# Patient Record
Sex: FEMALE | Race: WHITE | HISPANIC OR LATINO | Employment: FULL TIME | URBAN - METROPOLITAN AREA
[De-identification: names, ages, dates, MRNs, and addresses within clinical notes are randomized per-mention and may not be internally consistent; named-entity substitution may affect disease eponyms.]

---

## 2024-06-04 ENCOUNTER — OFFICE VISIT (OUTPATIENT)
Dept: URGENT CARE | Facility: CLINIC | Age: 39
End: 2024-06-04
Payer: COMMERCIAL

## 2024-06-04 VITALS
DIASTOLIC BLOOD PRESSURE: 80 MMHG | TEMPERATURE: 97.6 F | WEIGHT: 259 LBS | HEIGHT: 66 IN | OXYGEN SATURATION: 100 % | BODY MASS INDEX: 41.62 KG/M2 | HEART RATE: 68 BPM | SYSTOLIC BLOOD PRESSURE: 140 MMHG | RESPIRATION RATE: 19 BRPM

## 2024-06-04 DIAGNOSIS — R51.9 GENERALIZED HEADACHE: ICD-10-CM

## 2024-06-04 DIAGNOSIS — R03.0 ELEVATED BLOOD PRESSURE READING: ICD-10-CM

## 2024-06-04 PROCEDURE — 99203 OFFICE O/P NEW LOW 30 MIN: CPT | Performed by: NURSE PRACTITIONER

## 2024-06-04 PROCEDURE — 3075F SYST BP GE 130 - 139MM HG: CPT | Performed by: NURSE PRACTITIONER

## 2024-06-04 PROCEDURE — 3079F DIAST BP 80-89 MM HG: CPT | Performed by: NURSE PRACTITIONER

## 2024-06-04 ASSESSMENT — VISUAL ACUITY: OU: 1

## 2024-06-04 NOTE — PROGRESS NOTES
"Cecelia Baldwin is a 38 y.o. female who presents for Hypertension Follow-up      HPI  This is a new problem. Cecelia Baldwin is a 38 y.o. patient who presents to urgent care with c/o: Feeling nauseated at work today with headache. She had her BP checked at work and it was high 159/98. She does not take medications for elevated BP. She does not see a PCP. Denies swelling in legs. Does not have HA's daily but had headaches occasionally that come and go and usually do not bother her.  Had blood pressure medicines little over a year ago.  She stopped taking them because she stopped seeing her provider and ran out of pills.  She has a positive family history of hypertension in her both her mother and her father.  Denies chest pain, sob, vision change ( she says her vision is blurry all the time for a long time - no change).   She just started her job at [employer] 15 days ago.  It has been stressful learning a new job and going through orientation.  She walks a lot at her new job which she has found very fatiguing.    ROS See HPI    Allergies:       Allergies   Allergen Reactions    Penicillins Swelling     Swelling        PMSFS Hx:  History reviewed. No pertinent past medical history.  History reviewed. No pertinent surgical history.  History reviewed. No pertinent family history.  Social History     Tobacco Use    Smoking status: Not on file    Smokeless tobacco: Not on file   Substance Use Topics    Alcohol use: Not on file       Problems:   There is no problem list on file for this patient.      Medications:   No current outpatient medications on file prior to visit.     No current facility-administered medications on file prior to visit.        Objective:     BP (!) 138/98   Pulse 68   Temp 36.4 °C (97.6 °F) (Temporal)   Resp 19   Ht 1.676 m (5' 6\")   Wt 117 kg (259 lb)   SpO2 100%   BMI 41.80 kg/m²     Physical Exam  Vitals and nursing note reviewed.   Constitutional:       General: She is not in acute distress.     " Appearance: Normal appearance.   HENT:      Mouth/Throat:      Mouth: Mucous membranes are moist.   Eyes:      General: Vision grossly intact.      Extraocular Movements: Extraocular movements intact.      Conjunctiva/sclera: Conjunctivae normal.   Cardiovascular:      Rate and Rhythm: Normal rate and regular rhythm.      Pulses: Normal pulses.      Heart sounds: Normal heart sounds.   Pulmonary:      Effort: Pulmonary effort is normal.      Breath sounds: Normal breath sounds.   Musculoskeletal:      Right lower leg: No edema.      Left lower leg: No edema.   Skin:     General: Skin is warm.      Capillary Refill: Capillary refill takes less than 2 seconds.   Neurological:      Mental Status: She is alert and oriented to person, place, and time.   Psychiatric:         Mood and Affect: Mood normal.         Behavior: Behavior normal.         Thought Content: Thought content normal.         Assessment /Associated Orders:      1. Elevated blood pressure reading  Referral to establish with PCP      2. Generalized headache  Referral to establish with PCP            Medical Decision Making:    Cecelia  is a very pleasant 38 y.o. female who is clinically stable at today's acute urgent care visit.  No acute distress noted.  VSS. Appropriate for outpatient care at this time.   Acute problem today with uncertain prognosis.   Referred to  primary care provider for monitoring and management. Educated in end organ effects of uncontrolled BP  including MI, CVA, Blindness, CRF and death. Educated in TLC's.  Advised close monitoring.  Home blood pressure monitor.  Keep a written record and take to PCP appointment.  Seek emergent care if having acute symptoms such as chest pain, weakness, persistent headache, vision change  Keep well-hydrated  Increase rest  Discussed Dx, management options (risks,benefits, and alternatives to planned treatment), natural progression and supportive care.  Expressed understanding and the treatment plan  was agreed upon.   Questions were encouraged and answered   Return to urgent care prn if new or worsening sx or if there is no improvement in condition prn.    Educated in Red flags and indications to immediately call 911 or present to the Emergency Department.       Time I spent evaluating Cecelia Baldwin in urgent care today was 30  minutes. This time includes preparing for visit, reviewing any pertinent notes or test results, counseling/education, exam, obtaining HPI, interpretation of lab tests, medication management and documentation as indicated above.Time does not include separately billable procedures noted .       Please note that this dictation was created using voice recognition software. I have worked with consultants from the vendor as well as technical experts from [facility] to optimize the interface. I have made every reasonable attempt to correct obvious errors, but I expect that there are errors of grammar and possibly content that I did not discover before finalizing the note.  This note was electronically signed by provider

## 2024-06-04 NOTE — LETTER
June 4, 2024       Patient: Cecelia Baldwin   YOB: 1985   Date of Visit: 6/4/2024         To Whom It May Concern:    In my medical opinion, I recommend that Cecelia may return to full duty, no restrictions.              Sincerely,          TONIE Salcedo  Electronically Signed

## 2024-07-23 SDOH — ECONOMIC STABILITY: FOOD INSECURITY: WITHIN THE PAST 12 MONTHS, THE FOOD YOU BOUGHT JUST DIDN'T LAST AND YOU DIDN'T HAVE MONEY TO GET MORE.: NEVER TRUE

## 2024-07-23 SDOH — ECONOMIC STABILITY: INCOME INSECURITY: IN THE LAST 12 MONTHS, WAS THERE A TIME WHEN YOU WERE NOT ABLE TO PAY THE MORTGAGE OR RENT ON TIME?: YES

## 2024-07-23 SDOH — ECONOMIC STABILITY: FOOD INSECURITY: WITHIN THE PAST 12 MONTHS, YOU WORRIED THAT YOUR FOOD WOULD RUN OUT BEFORE YOU GOT MONEY TO BUY MORE.: SOMETIMES TRUE

## 2024-07-23 SDOH — HEALTH STABILITY: MENTAL HEALTH
STRESS IS WHEN SOMEONE FEELS TENSE, NERVOUS, ANXIOUS, OR CAN'T SLEEP AT NIGHT BECAUSE THEIR MIND IS TROUBLED. HOW STRESSED ARE YOU?: ONLY A LITTLE

## 2024-07-23 SDOH — ECONOMIC STABILITY: HOUSING INSECURITY
IN THE LAST 12 MONTHS, WAS THERE A TIME WHEN YOU DID NOT HAVE A STEADY PLACE TO SLEEP OR SLEPT IN A SHELTER (INCLUDING NOW)?: NO

## 2024-07-23 SDOH — ECONOMIC STABILITY: INCOME INSECURITY: HOW HARD IS IT FOR YOU TO PAY FOR THE VERY BASICS LIKE FOOD, HOUSING, MEDICAL CARE, AND HEATING?: SOMEWHAT HARD

## 2024-07-23 SDOH — ECONOMIC STABILITY: HOUSING INSECURITY
IN THE LAST 12 MONTHS, WAS THERE A TIME WHEN YOU DID NOT HAVE A STEADY PLACE TO SLEEP OR SLEPT IN A SHELTER (INCLUDING NOW)?: YES

## 2024-07-23 SDOH — ECONOMIC STABILITY: TRANSPORTATION INSECURITY
IN THE PAST 12 MONTHS, HAS THE LACK OF TRANSPORTATION KEPT YOU FROM MEDICAL APPOINTMENTS OR FROM GETTING MEDICATIONS?: NO

## 2024-07-23 SDOH — HEALTH STABILITY: PHYSICAL HEALTH: ON AVERAGE, HOW MANY MINUTES DO YOU ENGAGE IN EXERCISE AT THIS LEVEL?: 150+ MIN

## 2024-07-23 SDOH — HEALTH STABILITY: PHYSICAL HEALTH: ON AVERAGE, HOW MANY DAYS PER WEEK DO YOU ENGAGE IN MODERATE TO STRENUOUS EXERCISE (LIKE A BRISK WALK)?: 3 DAYS

## 2024-07-23 SDOH — ECONOMIC STABILITY: HOUSING INSECURITY: IN THE LAST 12 MONTHS, HOW MANY PLACES HAVE YOU LIVED?: 1

## 2024-07-23 SDOH — ECONOMIC STABILITY: TRANSPORTATION INSECURITY
IN THE PAST 12 MONTHS, HAS LACK OF RELIABLE TRANSPORTATION KEPT YOU FROM MEDICAL APPOINTMENTS, MEETINGS, WORK OR FROM GETTING THINGS NEEDED FOR DAILY LIVING?: NO

## 2024-07-23 SDOH — ECONOMIC STABILITY: TRANSPORTATION INSECURITY
IN THE PAST 12 MONTHS, HAS LACK OF TRANSPORTATION KEPT YOU FROM MEETINGS, WORK, OR FROM GETTING THINGS NEEDED FOR DAILY LIVING?: NO

## 2024-07-23 ASSESSMENT — SOCIAL DETERMINANTS OF HEALTH (SDOH)
ARE YOU MARRIED, WIDOWED, DIVORCED, SEPARATED, NEVER MARRIED, OR LIVING WITH A PARTNER?: LIVING WITH PARTNER
IN A TYPICAL WEEK, HOW MANY TIMES DO YOU TALK ON THE PHONE WITH FAMILY, FRIENDS, OR NEIGHBORS?: MORE THAN THREE TIMES A WEEK
IN A TYPICAL WEEK, HOW MANY TIMES DO YOU TALK ON THE PHONE WITH FAMILY, FRIENDS, OR NEIGHBORS?: MORE THAN THREE TIMES A WEEK
HOW OFTEN DO YOU GET TOGETHER WITH FRIENDS OR RELATIVES?: NEVER
DO YOU BELONG TO ANY CLUBS OR ORGANIZATIONS SUCH AS CHURCH GROUPS UNIONS, FRATERNAL OR ATHLETIC GROUPS, OR SCHOOL GROUPS?: NO
WITHIN THE PAST 12 MONTHS, YOU WORRIED THAT YOUR FOOD WOULD RUN OUT BEFORE YOU GOT THE MONEY TO BUY MORE: SOMETIMES TRUE
HOW OFTEN DO YOU HAVE SIX OR MORE DRINKS ON ONE OCCASION: NEVER
ARE YOU MARRIED, WIDOWED, DIVORCED, SEPARATED, NEVER MARRIED, OR LIVING WITH A PARTNER?: LIVING WITH PARTNER
DO YOU BELONG TO ANY CLUBS OR ORGANIZATIONS SUCH AS CHURCH GROUPS UNIONS, FRATERNAL OR ATHLETIC GROUPS, OR SCHOOL GROUPS?: NO
HOW OFTEN DO YOU ATTENT MEETINGS OF THE CLUB OR ORGANIZATION YOU BELONG TO?: NEVER
HOW OFTEN DO YOU HAVE A DRINK CONTAINING ALCOHOL: MONTHLY OR LESS
HOW OFTEN DO YOU ATTEND CHURCH OR RELIGIOUS SERVICES?: NEVER
HOW OFTEN DO YOU ATTENT MEETINGS OF THE CLUB OR ORGANIZATION YOU BELONG TO?: NEVER
HOW MANY DRINKS CONTAINING ALCOHOL DO YOU HAVE ON A TYPICAL DAY WHEN YOU ARE DRINKING: 1 OR 2
HOW HARD IS IT FOR YOU TO PAY FOR THE VERY BASICS LIKE FOOD, HOUSING, MEDICAL CARE, AND HEATING?: SOMEWHAT HARD
HOW OFTEN DO YOU GET TOGETHER WITH FRIENDS OR RELATIVES?: NEVER
HOW OFTEN DO YOU ATTEND CHURCH OR RELIGIOUS SERVICES?: NEVER
IN THE PAST 12 MONTHS, HAS THE ELECTRIC, GAS, OIL, OR WATER COMPANY THREATENED TO SHUT OFF SERVICE IN YOUR HOME?: YES

## 2024-07-23 ASSESSMENT — LIFESTYLE VARIABLES
HOW OFTEN DO YOU HAVE A DRINK CONTAINING ALCOHOL: MONTHLY OR LESS
HOW OFTEN DO YOU HAVE SIX OR MORE DRINKS ON ONE OCCASION: NEVER
SKIP TO QUESTIONS 9-10: 1
AUDIT-C TOTAL SCORE: 1
HOW MANY STANDARD DRINKS CONTAINING ALCOHOL DO YOU HAVE ON A TYPICAL DAY: 1 OR 2

## 2024-07-26 ENCOUNTER — HOSPITAL ENCOUNTER (OUTPATIENT)
Dept: LAB | Facility: MEDICAL CENTER | Age: 39
End: 2024-07-26
Attending: FAMILY MEDICINE
Payer: COMMERCIAL

## 2024-07-26 ENCOUNTER — OFFICE VISIT (OUTPATIENT)
Dept: MEDICAL GROUP | Facility: MEDICAL CENTER | Age: 39
End: 2024-07-26
Attending: NURSE PRACTITIONER
Payer: COMMERCIAL

## 2024-07-26 VITALS
BODY MASS INDEX: 41.24 KG/M2 | DIASTOLIC BLOOD PRESSURE: 94 MMHG | HEIGHT: 66 IN | SYSTOLIC BLOOD PRESSURE: 154 MMHG | OXYGEN SATURATION: 99 % | HEART RATE: 79 BPM | WEIGHT: 256.6 LBS | TEMPERATURE: 98.1 F

## 2024-07-26 DIAGNOSIS — R51.9 CHRONIC NONINTRACTABLE HEADACHE, UNSPECIFIED HEADACHE TYPE: ICD-10-CM

## 2024-07-26 DIAGNOSIS — S89.91XA INJURY OF RIGHT KNEE, INITIAL ENCOUNTER: ICD-10-CM

## 2024-07-26 DIAGNOSIS — Z83.3 FAMILY HISTORY OF DIABETES MELLITUS (DM): ICD-10-CM

## 2024-07-26 DIAGNOSIS — I10 PRIMARY HYPERTENSION: ICD-10-CM

## 2024-07-26 DIAGNOSIS — Z00.00 ENCOUNTER FOR MEDICAL EXAMINATION TO ESTABLISH CARE: Primary | ICD-10-CM

## 2024-07-26 DIAGNOSIS — F51.01 PRIMARY INSOMNIA: ICD-10-CM

## 2024-07-26 DIAGNOSIS — Z13.220 ENCOUNTER FOR LIPID SCREENING FOR CARDIOVASCULAR DISEASE: ICD-10-CM

## 2024-07-26 DIAGNOSIS — Z13.6 ENCOUNTER FOR LIPID SCREENING FOR CARDIOVASCULAR DISEASE: ICD-10-CM

## 2024-07-26 DIAGNOSIS — R06.83 SNORING: ICD-10-CM

## 2024-07-26 DIAGNOSIS — G89.29 CHRONIC NONINTRACTABLE HEADACHE, UNSPECIFIED HEADACHE TYPE: ICD-10-CM

## 2024-07-26 DIAGNOSIS — Z13.29 THYROID DISORDER SCREENING: ICD-10-CM

## 2024-07-26 LAB
ALBUMIN SERPL BCP-MCNC: 4.4 G/DL (ref 3.2–4.9)
ALBUMIN/GLOB SERPL: 1.3 G/DL
ALP SERPL-CCNC: 81 U/L (ref 30–99)
ALT SERPL-CCNC: 26 U/L (ref 2–50)
ANION GAP SERPL CALC-SCNC: 12 MMOL/L (ref 7–16)
AST SERPL-CCNC: 19 U/L (ref 12–45)
BASOPHILS # BLD AUTO: 0.5 % (ref 0–1.8)
BASOPHILS # BLD: 0.03 K/UL (ref 0–0.12)
BILIRUB SERPL-MCNC: 0.6 MG/DL (ref 0.1–1.5)
BUN SERPL-MCNC: 8 MG/DL (ref 8–22)
CALCIUM ALBUM COR SERPL-MCNC: 9 MG/DL (ref 8.5–10.5)
CALCIUM SERPL-MCNC: 9.3 MG/DL (ref 8.4–10.2)
CHLORIDE SERPL-SCNC: 101 MMOL/L (ref 96–112)
CHOLEST SERPL-MCNC: 207 MG/DL (ref 100–199)
CO2 SERPL-SCNC: 24 MMOL/L (ref 20–33)
CREAT SERPL-MCNC: 0.64 MG/DL (ref 0.5–1.4)
EOSINOPHIL # BLD AUTO: 0.17 K/UL (ref 0–0.51)
EOSINOPHIL NFR BLD: 3 % (ref 0–6.9)
ERYTHROCYTE [DISTWIDTH] IN BLOOD BY AUTOMATED COUNT: 43.6 FL (ref 35.9–50)
EST. AVERAGE GLUCOSE BLD GHB EST-MCNC: 117 MG/DL
FASTING STATUS PATIENT QL REPORTED: NORMAL
GFR SERPLBLD CREATININE-BSD FMLA CKD-EPI: 115 ML/MIN/1.73 M 2
GLOBULIN SER CALC-MCNC: 3.3 G/DL (ref 1.9–3.5)
GLUCOSE SERPL-MCNC: 102 MG/DL (ref 65–99)
HBA1C MFR BLD: 5.7 % (ref 4–5.6)
HCT VFR BLD AUTO: 45.6 % (ref 37–47)
HDLC SERPL-MCNC: 55 MG/DL
HGB BLD-MCNC: 15.4 G/DL (ref 12–16)
IMM GRANULOCYTES # BLD AUTO: 0.01 K/UL (ref 0–0.11)
IMM GRANULOCYTES NFR BLD AUTO: 0.2 % (ref 0–0.9)
LDLC SERPL CALC-MCNC: 127 MG/DL
LYMPHOCYTES # BLD AUTO: 1.84 K/UL (ref 1–4.8)
LYMPHOCYTES NFR BLD: 32.1 % (ref 22–41)
MCH RBC QN AUTO: 31.2 PG (ref 27–33)
MCHC RBC AUTO-ENTMCNC: 33.8 G/DL (ref 32.2–35.5)
MCV RBC AUTO: 92.5 FL (ref 81.4–97.8)
MONOCYTES # BLD AUTO: 0.55 K/UL (ref 0–0.85)
MONOCYTES NFR BLD AUTO: 9.6 % (ref 0–13.4)
NEUTROPHILS # BLD AUTO: 3.13 K/UL (ref 1.82–7.42)
NEUTROPHILS NFR BLD: 54.6 % (ref 44–72)
NRBC # BLD AUTO: 0 K/UL
NRBC BLD-RTO: 0 /100 WBC (ref 0–0.2)
PLATELET # BLD AUTO: 324 K/UL (ref 164–446)
PMV BLD AUTO: 9.2 FL (ref 9–12.9)
POTASSIUM SERPL-SCNC: 4.9 MMOL/L (ref 3.6–5.5)
PROT SERPL-MCNC: 7.7 G/DL (ref 6–8.2)
RBC # BLD AUTO: 4.93 M/UL (ref 4.2–5.4)
SODIUM SERPL-SCNC: 137 MMOL/L (ref 135–145)
T4 FREE SERPL-MCNC: 0.92 NG/DL (ref 0.93–1.7)
TRIGL SERPL-MCNC: 127 MG/DL (ref 0–149)
TSH SERPL DL<=0.005 MIU/L-ACNC: 2.71 UIU/ML (ref 0.38–5.33)
WBC # BLD AUTO: 5.7 K/UL (ref 4.8–10.8)

## 2024-07-26 PROCEDURE — 80053 COMPREHEN METABOLIC PANEL: CPT

## 2024-07-26 PROCEDURE — 3077F SYST BP >= 140 MM HG: CPT | Performed by: FAMILY MEDICINE

## 2024-07-26 PROCEDURE — 99214 OFFICE O/P EST MOD 30 MIN: CPT | Performed by: FAMILY MEDICINE

## 2024-07-26 PROCEDURE — 85025 COMPLETE CBC W/AUTO DIFF WBC: CPT

## 2024-07-26 PROCEDURE — 3080F DIAST BP >= 90 MM HG: CPT | Performed by: FAMILY MEDICINE

## 2024-07-26 PROCEDURE — 83036 HEMOGLOBIN GLYCOSYLATED A1C: CPT

## 2024-07-26 PROCEDURE — 36415 COLL VENOUS BLD VENIPUNCTURE: CPT

## 2024-07-26 PROCEDURE — 84439 ASSAY OF FREE THYROXINE: CPT

## 2024-07-26 PROCEDURE — 84443 ASSAY THYROID STIM HORMONE: CPT

## 2024-07-26 PROCEDURE — 80061 LIPID PANEL: CPT

## 2024-07-26 RX ORDER — OLMESARTAN MEDOXOMIL 20 MG/1
20 TABLET ORAL DAILY
Qty: 90 TABLET | Refills: 3 | Status: SHIPPED | OUTPATIENT
Start: 2024-07-26

## 2024-07-29 ENCOUNTER — HOSPITAL ENCOUNTER (EMERGENCY)
Facility: MEDICAL CENTER | Age: 39
End: 2024-07-29
Attending: EMERGENCY MEDICINE
Payer: COMMERCIAL

## 2024-07-29 ENCOUNTER — APPOINTMENT (OUTPATIENT)
Dept: RADIOLOGY | Facility: MEDICAL CENTER | Age: 39
End: 2024-07-29
Attending: EMERGENCY MEDICINE
Payer: COMMERCIAL

## 2024-07-29 ENCOUNTER — OFFICE VISIT (OUTPATIENT)
Dept: URGENT CARE | Facility: CLINIC | Age: 39
End: 2024-07-29
Payer: COMMERCIAL

## 2024-07-29 VITALS
WEIGHT: 259.04 LBS | BODY MASS INDEX: 41.63 KG/M2 | TEMPERATURE: 98 F | HEART RATE: 76 BPM | OXYGEN SATURATION: 95 % | RESPIRATION RATE: 16 BRPM | DIASTOLIC BLOOD PRESSURE: 82 MMHG | HEIGHT: 66 IN | SYSTOLIC BLOOD PRESSURE: 141 MMHG

## 2024-07-29 VITALS
OXYGEN SATURATION: 97 % | HEART RATE: 80 BPM | BODY MASS INDEX: 41.78 KG/M2 | RESPIRATION RATE: 20 BRPM | DIASTOLIC BLOOD PRESSURE: 84 MMHG | HEIGHT: 66 IN | SYSTOLIC BLOOD PRESSURE: 146 MMHG | TEMPERATURE: 97.7 F | WEIGHT: 260 LBS

## 2024-07-29 DIAGNOSIS — R03.0 ELEVATED BLOOD PRESSURE READING: ICD-10-CM

## 2024-07-29 DIAGNOSIS — I16.0 HYPERTENSIVE URGENCY: ICD-10-CM

## 2024-07-29 DIAGNOSIS — R07.9 CHEST PAIN, UNSPECIFIED TYPE: ICD-10-CM

## 2024-07-29 DIAGNOSIS — R42 DIZZINESS: ICD-10-CM

## 2024-07-29 DIAGNOSIS — R07.89 CHEST PRESSURE: ICD-10-CM

## 2024-07-29 LAB
ALBUMIN SERPL BCP-MCNC: 4.7 G/DL (ref 3.2–4.9)
ALBUMIN/GLOB SERPL: 1.4 G/DL
ALP SERPL-CCNC: 86 U/L (ref 30–99)
ALT SERPL-CCNC: 28 U/L (ref 2–50)
ANION GAP SERPL CALC-SCNC: 14 MMOL/L (ref 7–16)
AST SERPL-CCNC: 21 U/L (ref 12–45)
BASOPHILS # BLD AUTO: 0.7 % (ref 0–1.8)
BASOPHILS # BLD: 0.06 K/UL (ref 0–0.12)
BILIRUB SERPL-MCNC: 1.2 MG/DL (ref 0.1–1.5)
BUN SERPL-MCNC: 11 MG/DL (ref 8–22)
CALCIUM ALBUM COR SERPL-MCNC: 9.4 MG/DL (ref 8.5–10.5)
CALCIUM SERPL-MCNC: 10 MG/DL (ref 8.5–10.5)
CHLORIDE SERPL-SCNC: 100 MMOL/L (ref 96–112)
CO2 SERPL-SCNC: 22 MMOL/L (ref 20–33)
CREAT SERPL-MCNC: 0.52 MG/DL (ref 0.5–1.4)
EKG 4674: NORMAL
EKG IMPRESSION: NORMAL
EOSINOPHIL # BLD AUTO: 0.11 K/UL (ref 0–0.51)
EOSINOPHIL NFR BLD: 1.3 % (ref 0–6.9)
ERYTHROCYTE [DISTWIDTH] IN BLOOD BY AUTOMATED COUNT: 42.7 FL (ref 35.9–50)
GFR SERPLBLD CREATININE-BSD FMLA CKD-EPI: 121 ML/MIN/1.73 M 2
GLOBULIN SER CALC-MCNC: 3.3 G/DL (ref 1.9–3.5)
GLUCOSE BLD-MCNC: 108 MG/DL (ref 65–99)
GLUCOSE SERPL-MCNC: 95 MG/DL (ref 65–99)
HCG SERPL QL: NEGATIVE
HCT VFR BLD AUTO: 44.5 % (ref 37–47)
HGB BLD-MCNC: 15.6 G/DL (ref 12–16)
IMM GRANULOCYTES # BLD AUTO: 0.04 K/UL (ref 0–0.11)
IMM GRANULOCYTES NFR BLD AUTO: 0.5 % (ref 0–0.9)
LYMPHOCYTES # BLD AUTO: 2.56 K/UL (ref 1–4.8)
LYMPHOCYTES NFR BLD: 30.7 % (ref 22–41)
MCH RBC QN AUTO: 31.8 PG (ref 27–33)
MCHC RBC AUTO-ENTMCNC: 35.1 G/DL (ref 32.2–35.5)
MCV RBC AUTO: 90.6 FL (ref 81.4–97.8)
MONOCYTES # BLD AUTO: 0.7 K/UL (ref 0–0.85)
MONOCYTES NFR BLD AUTO: 8.4 % (ref 0–13.4)
NEUTROPHILS # BLD AUTO: 4.88 K/UL (ref 1.82–7.42)
NEUTROPHILS NFR BLD: 58.4 % (ref 44–72)
NRBC # BLD AUTO: 0 K/UL
NRBC BLD-RTO: 0 /100 WBC (ref 0–0.2)
PLATELET # BLD AUTO: 340 K/UL (ref 164–446)
PMV BLD AUTO: 9.6 FL (ref 9–12.9)
POTASSIUM SERPL-SCNC: 3.8 MMOL/L (ref 3.6–5.5)
PROT SERPL-MCNC: 8 G/DL (ref 6–8.2)
RBC # BLD AUTO: 4.91 M/UL (ref 4.2–5.4)
SODIUM SERPL-SCNC: 136 MMOL/L (ref 135–145)
TROPONIN T SERPL-MCNC: <6 NG/L (ref 6–19)
TROPONIN T SERPL-MCNC: <6 NG/L (ref 6–19)
WBC # BLD AUTO: 8.4 K/UL (ref 4.8–10.8)

## 2024-07-29 PROCEDURE — 80053 COMPREHEN METABOLIC PANEL: CPT

## 2024-07-29 PROCEDURE — 99284 EMERGENCY DEPT VISIT MOD MDM: CPT

## 2024-07-29 PROCEDURE — 84484 ASSAY OF TROPONIN QUANT: CPT | Mod: 91

## 2024-07-29 PROCEDURE — 93005 ELECTROCARDIOGRAM TRACING: CPT | Performed by: EMERGENCY MEDICINE

## 2024-07-29 PROCEDURE — 84703 CHORIONIC GONADOTROPIN ASSAY: CPT

## 2024-07-29 PROCEDURE — 36415 COLL VENOUS BLD VENIPUNCTURE: CPT

## 2024-07-29 PROCEDURE — 71045 X-RAY EXAM CHEST 1 VIEW: CPT

## 2024-07-29 PROCEDURE — 93005 ELECTROCARDIOGRAM TRACING: CPT

## 2024-07-29 PROCEDURE — 85025 COMPLETE CBC W/AUTO DIFF WBC: CPT

## 2024-07-29 RX ORDER — HYDRALAZINE HYDROCHLORIDE 20 MG/ML
20 INJECTION INTRAMUSCULAR; INTRAVENOUS ONCE
Status: DISCONTINUED | OUTPATIENT
Start: 2024-07-29 | End: 2024-07-29 | Stop reason: HOSPADM

## 2024-07-29 ASSESSMENT — FIBROSIS 4 INDEX
FIB4 SCORE: 0.45
FIB4 SCORE: 0.45

## 2024-07-29 NOTE — ED NOTES
Pt ambulatory to [facility] with steady gait.  Provided a gown to change into.  Placed up for ERP evaluation.

## 2024-07-29 NOTE — ED PROVIDER NOTES
"ED Provider Note    CHIEF COMPLAINT  Chief Complaint   Patient presents with    Lightheadedness     X 3 days, since she started taking Olmesartan 20 mg.    Chest Pain     Pressure to left chest. Onset today. Pt went to urgent care and they told her to come to ER.        EXTERNAL RECORDS REVIEWED  Outpatient Notes urgent treatment center note today concern for hypertension and abnormal EKG    HPI/ROS    LIMITATION TO HISTORY   Select: : None    OUTSIDE HISTORIAN(S):      Cecelia Baldwin is a 39 y.o. female who presents to the emergency department with chief complaint of chest pain and lightheadedness.  Patient has known diagnosis of hypertension she just recently started olmesartan 20 mg 3 days prior.  She states she has had occasional lightheadedness and symptoms and initiating the medicine.  Patient had chest pain for several weeks.  She reports a stabbing pain in the left side of her chest she reports that occasionally when she gets this she punches her chest and the pain goes away.  She also reports that she can sometimes \"shake it off\".  Patient reports today that she has minimal shortness of breath she has worsening shortness of breath if she lays flat she describes no swelling or pain in her legs no history of blood clots she is a non-smoker and she denies any previous history of early cardiac disease in her family or previous personal history of cardiac disease.  She has been told that her sugars been high recently and been diagnosed with prediabetes.    PAST MEDICAL HISTORY   has a past medical history of Hypertension.    SURGICAL HISTORY   has a past surgical history that includes cholecystectomy.    FAMILY HISTORY  Family History   Problem Relation Age of Onset    Hypertension Mother     Diabetes Father     No Known Problems Sister     No Known Problems Brother     Cancer Maternal Aunt         leukemia    Hypertension Maternal Aunt     Hypertension Maternal Uncle     Diabetes Paternal Aunt     " "Hypertension Maternal Grandmother        SOCIAL HISTORY  Social History     Tobacco Use    Smoking status: Never    Smokeless tobacco: Never   Vaping Use    Vaping status: Never Used   Substance and Sexual Activity    Alcohol use: Yes     Alcohol/week: 0.6 oz     Types: 1 Cans of beer per week     Comment: once a month    Drug use: Never    Sexual activity: Yes     Partners: Male     Birth control/protection: Male Sterilization     Comment: partnership       CURRENT MEDICATIONS  Home Medications    **Home medications have not yet been reviewed for this encounter**       Audit from Redirected Encounters    **Home medications have not yet been reviewed for this encounter**         ALLERGIES  Allergies   Allergen Reactions    Penicillins Swelling     Swelling        PHYSICAL EXAM  VITAL SIGNS: BP (!) 175/96   Pulse 83   Temp 36.5 °C (97.7 °F) (Temporal)   Resp 14   Ht 1.676 m (5' 6\")   Wt 118 kg (259 lb 0.7 oz)   LMP 05/14/2024 Comment: irregular menses  SpO2 97% Comment: RA  BMI 41.81 kg/m²      Pulse ox interpretation: I interpret this pulse ox as normal.  Constitutional: Alert and oriented x 3, minimal distress  HEENT: Atraumatic normocephalic, pupils are equal round reactive to light extraocular movements are intact. The nares is clear, external ears are normal, mouth shows moist mucous membranes normal dentition for age  Neck: Supple, no JVD no tracheal deviation  Cardiovascular: Regular rate and rhythm no murmur rub or gallop 2+ pulses peripherally x4  Thorax & Lungs: No respiratory distress, no wheezes rales or rhonchi, No chest tenderness.   GI: Soft nontender nondistended positive bowel sounds, no peritoneal signs  Skin: Warm dry no acute rash or lesion  Musculoskeletal: Moving all extremities with full range and 5 of 5 strength no acute  deformity  Neurologic: Cranial nerves III through XII are grossly intact no sensory deficit no cerebellar dysfunction   Psychiatric: Appropriate affect for situation " at this time      EKG/LABS  Results for orders placed or performed during the hospital encounter of 07/29/24   CBC with Differential   Result Value Ref Range    WBC 8.4 4.8 - 10.8 K/uL    RBC 4.91 4.20 - 5.40 M/uL    Hemoglobin 15.6 12.0 - 16.0 g/dL    Hematocrit 44.5 37.0 - 47.0 %    MCV 90.6 81.4 - 97.8 fL    MCH 31.8 27.0 - 33.0 pg    MCHC 35.1 32.2 - 35.5 g/dL    RDW 42.7 35.9 - 50.0 fL    Platelet Count 340 164 - 446 K/uL    MPV 9.6 9.0 - 12.9 fL    Neutrophils-Polys 58.40 44.00 - 72.00 %    Lymphocytes 30.70 22.00 - 41.00 %    Monocytes 8.40 0.00 - 13.40 %    Eosinophils 1.30 0.00 - 6.90 %    Basophils 0.70 0.00 - 1.80 %    Immature Granulocytes 0.50 0.00 - 0.90 %    Nucleated RBC 0.00 0.00 - 0.20 /100 WBC    Neutrophils (Absolute) 4.88 1.82 - 7.42 K/uL    Lymphs (Absolute) 2.56 1.00 - 4.80 K/uL    Monos (Absolute) 0.70 0.00 - 0.85 K/uL    Eos (Absolute) 0.11 0.00 - 0.51 K/uL    Baso (Absolute) 0.06 0.00 - 0.12 K/uL    Immature Granulocytes (abs) 0.04 0.00 - 0.11 K/uL    NRBC (Absolute) 0.00 K/uL   Complete Metabolic Panel (CMP)   Result Value Ref Range    Sodium 136 135 - 145 mmol/L    Potassium 3.8 3.6 - 5.5 mmol/L    Chloride 100 96 - 112 mmol/L    Co2 22 20 - 33 mmol/L    Anion Gap 14.0 7.0 - 16.0    Glucose 95 65 - 99 mg/dL    Bun 11 8 - 22 mg/dL    Creatinine 0.52 0.50 - 1.40 mg/dL    Calcium 10.0 8.5 - 10.5 mg/dL    Correct Calcium 9.4 8.5 - 10.5 mg/dL    AST(SGOT) 21 12 - 45 U/L    ALT(SGPT) 28 2 - 50 U/L    Alkaline Phosphatase 86 30 - 99 U/L    Total Bilirubin 1.2 0.1 - 1.5 mg/dL    Albumin 4.7 3.2 - 4.9 g/dL    Total Protein 8.0 6.0 - 8.2 g/dL    Globulin 3.3 1.9 - 3.5 g/dL    A-G Ratio 1.4 g/dL   Troponins NOW   Result Value Ref Range    Troponin T <6 6 - 19 ng/L   Troponins in two (2) hours   Result Value Ref Range    Troponin T <6 6 - 19 ng/L   HCG Qual Serum   Result Value Ref Range    Beta-Hcg Qualitative Serum Negative Negative   ESTIMATED GFR   Result Value Ref Range    GFR (CKD-EPI) 121 >60  mL/min/1.73 m 2   EKG   Result Value Ref Range    Report       [facility] Emergency Dept.    Test Date:  2024  Pt Name:    MILA ELLISON                  Department: ER  MRN:        [id number]                      Room:  Gender:     Female                       Technician: [id number]  :        1985                   Requested By:ER TRIAGE PROTOCOL  Order #:    [id number]                    Reading MD: JONY RAMÍREZ MD    Measurements  Intervals                                Axis  Rate:       80                           P:          48  KS:         149                          QRS:        -57  QRSD:       103                          T:          33  QT:         398  QTc:        460    Interpretive Statements  Sinus rhythm  LAD, consider left anterior fascicular block  Abnormal R-wave progression, late transition  Borderline T abnormalities, anterior leads  No previous ECG available for comparison  Electronically Signed On 2024 12:00:04 PDT by JONY RAMÍREZ MD         I have independently interpreted this EKG    RADIOLOGY/PROCEDURES   I have independently interpreted the diagnostic imaging associated with this visit and am waiting the final reading from the radiologist.   My preliminary interpretation is as follows: No focal consolidation    Radiologist interpretation:  DX-CHEST-PORTABLE (1 VIEW)   Final Result      No acute cardiac or pulmonary abnormalities are identified.          COURSE & MEDICAL DECISION MAKING    ASSESSMENT, COURSE AND PLAN  Care Narrative: Very pleasant 39-year-old female history of hypertension just recently started new blood pressure medication with some occasional dizziness over the last couple days and some tightness in her chest.  Her EKG is nonischemic here her troponin is undetectable x 2.  Her blood pressure improved spontaneously and did not require intervention here and patient is actually feeling much better at this time.  Patient is given  "instructions to follow-up with primary care to continue to monitor blood pressure and to titrate her blood pressure medication.  She is given instructions to return here should she have further chest pain shortness of breath any other acute symptom change or concerns she is otherwise discharged in stable and improved condition.     ADDITIONAL PROBLEMS MANAGED    DISPOSITION AND DISCUSSIONS    I have discussed management of the patient with the following physicians and COEDY's:      Discussion of management with other QHP or appropriate source(s):      Escalation of care considered, and ultimately not performed:acute inpatient care management, however at this time, the patient is most appropriate for outpatient management    Barriers to care at this time, including but not limited to: .     Decision tools and prescription drugs considered including, but not limited to: .  BP (!) 141/82   Pulse 76   Temp 36.7 °C (98 °F) (Temporal)   Resp 16   Ht 1.676 m (5' 6\")   Wt 118 kg (259 lb 0.7 oz)   LMP 05/14/2024 Comment: irregular menses  SpO2 95%   BMI 41.81 kg/m²     Day Navarro, A.P.R.N.  [address]  [address]  [facility] [id number] [phone]    In 1 week      [facility], Emergency Dept  [address] [facility] [address] [phone]    in 12-24 hours if symptoms persist, immediately If symptoms worsen, or if you develop any other symptoms or concerns      FINAL DIAGNOSIS  1. Chest pain, unspecified type    2.  Hypertensive urgency     Electronically signed by: Tod Lindsey M.D.      "

## 2024-07-29 NOTE — ED TRIAGE NOTES
"Chief Complaint   Patient presents with    Lightheadedness     X 3 days, since she started taking Olmesartan 20 mg.    Chest Pain     Pressure to left chest. Onset today. Pt went to urgent care and they told her to come to ER.      Pt ambulates from lobby with steady gait. Skin signs flushed, warm, moist. Pt speaking full sentences, A & O x 4. Grimacing, restless. Respirations equal and unlabored. Pt educated on triage process and to alert staff of any changing conditions. PT to EKG room.     BP (!) 175/96   Pulse 83   Temp 36.5 °C (97.7 °F) (Temporal)   Resp 14   Ht 1.676 m (5' 6\")   Wt 118 kg (259 lb 0.7 oz)   LMP 05/14/2024 Comment: irregular menses  SpO2 97% Comment: RA  BMI 41.81 kg/m²       "

## 2024-08-16 ENCOUNTER — TELEPHONE (OUTPATIENT)
Dept: HEALTH INFORMATION MANAGEMENT | Facility: OTHER | Age: 39
End: 2024-08-16
Payer: COMMERCIAL

## 2024-08-29 ENCOUNTER — HOME STUDY (OUTPATIENT)
Dept: SLEEP MEDICINE | Facility: MEDICAL CENTER | Age: 39
End: 2024-08-29
Attending: FAMILY MEDICINE
Payer: COMMERCIAL

## 2024-08-29 ENCOUNTER — OFFICE VISIT (OUTPATIENT)
Dept: MEDICAL GROUP | Facility: MEDICAL CENTER | Age: 39
End: 2024-08-29
Payer: COMMERCIAL

## 2024-08-29 VITALS
HEIGHT: 66 IN | TEMPERATURE: 97.6 F | HEART RATE: 67 BPM | WEIGHT: 254 LBS | SYSTOLIC BLOOD PRESSURE: 136 MMHG | BODY MASS INDEX: 40.82 KG/M2 | OXYGEN SATURATION: 99 % | DIASTOLIC BLOOD PRESSURE: 82 MMHG

## 2024-08-29 DIAGNOSIS — K58.0 IRRITABLE BOWEL SYNDROME WITH DIARRHEA: ICD-10-CM

## 2024-08-29 DIAGNOSIS — I10 PRIMARY HYPERTENSION: ICD-10-CM

## 2024-08-29 DIAGNOSIS — R06.83 SNORING: ICD-10-CM

## 2024-08-29 DIAGNOSIS — R51.9 CHRONIC NONINTRACTABLE HEADACHE, UNSPECIFIED HEADACHE TYPE: ICD-10-CM

## 2024-08-29 DIAGNOSIS — F51.01 PRIMARY INSOMNIA: ICD-10-CM

## 2024-08-29 DIAGNOSIS — G89.29 CHRONIC NONINTRACTABLE HEADACHE, UNSPECIFIED HEADACHE TYPE: ICD-10-CM

## 2024-08-29 DIAGNOSIS — N92.1 MENORRHAGIA WITH IRREGULAR CYCLE: ICD-10-CM

## 2024-08-29 PROCEDURE — 95800 SLP STDY UNATTENDED: CPT | Performed by: STUDENT IN AN ORGANIZED HEALTH CARE EDUCATION/TRAINING PROGRAM

## 2024-08-29 RX ORDER — LOSARTAN POTASSIUM 50 MG/1
50 TABLET ORAL DAILY
Qty: 90 TABLET | Refills: 3 | Status: SHIPPED | OUTPATIENT
Start: 2024-08-29

## 2024-08-29 ASSESSMENT — FIBROSIS 4 INDEX: FIB4 SCORE: 0.46

## 2024-08-29 NOTE — PROGRESS NOTES
Verbal consent was acquired by the patient to use [employer] ambient listening note generation during this visit:  Yes      Chief complaint::Diagnoses of Primary hypertension and Menorrhagia with irregular cycle were pertinent to this visit.    Assessment and Plan:   The following treatment plan was discussed:     Assessment & Plan  1. Primary Hypertension.  Chronic, and stable  The current medication, olmesartan, was too strong, causing adverse effects. She was switched to losartan 50 mg. She is advised to start with half a tablet and monitor her blood pressure before and after taking the medication. If blood pressure remains high, she should take the other half an hour later. If a full tablet is still insufficient, hydrochlorothiazide may be added. Blood pressure should be monitored closely.    2. Heavy Menstruation.  Chronic, unstable  A pelvic ultrasound has been ordered to check for uterine fibroids, which could be causing heavy bleeding. A referral to gynecology has been placed. Hormone therapy, such as birth control, was discussed as a potential treatment to reduce bleeding.    3. Irritable Bowel Syndrome (IBS).  Chronic, unstable  She reports frequent flare-ups and difficulty managing symptoms. She is advised to start a probiotic and increase dietary fiber intake, potentially using Metamucil up to three times a day. Reducing processed, greasy, and fatty foods is recommended.    4. Health Maintenance.  She is due for a Pap smear and will need to schedule this.    Follow-up  She will follow up in 6 months for a blood pressure check.  Cecelia was seen today for hypertension follow-up and lab results.    Diagnoses and all orders for this visit:    Primary hypertension  -     losartan (COZAAR) 50 MG Tab; Take 1 Tablet by mouth every day.    Menorrhagia with irregular cycle  -     US-PELVIC COMPLETE (TRANSABDOMINAL/TRANSVAGINAL) (COMBO); Future  -     Referral to Gynecology        Followup: Return in about 6 months  "(around 2/28/2025) for Follow-up on blood pressure.    Subjective/HPI:   HPI:    Cecelia Baldwin is a pleasant 39 y.o. female here for   Chief Complaint   Patient presents with    Hypertension Follow-up    Lab Results        History of Present Illness  The patient is a 39-year-old female who is here to follow up on her blood pressure and her lab results.    She has been taking olmesartan for her blood pressure, but the dosage appears to be insufficient. She took her medication today.    She was diagnosed with Irritable Bowel Syndrome (IBS) following bladder removal. This condition frequently flares up, particularly at work, causing discomfort and frequent bathroom visits regardless of her diet. The symptoms have worsened to the point where even fruit consumption triggers a need for the restroom. She has lost some weight due to this issue. She avoids regular milk and cheese, opting for almond milk instead, which seems to be less irritating to her stomach.    She is overdue for a Pap smear. Her menstrual cycle has become irregular, with periods occurring every three months. These periods are characterized by heavy bleeding, large clots, and severe pain. She needs to change her tampons every hour and a half due to the heavy flow, which has limited her ability to leave her home.    She is going to  the equipment for her sleep study today.    Current Medicines (including changes today)  Current Outpatient Medications   Medication Sig Dispense Refill    losartan (COZAAR) 50 MG Tab Take 1 Tablet by mouth every day. 90 Tablet 3     No current facility-administered medications for this visit.     Past Medical/ Surgical History  She  has a past medical history of Hypertension.  She  has a past surgical history that includes cholecystectomy.       Objective:   /82   Pulse 67   Temp 36.4 °C (97.6 °F)   Ht 1.676 m (5' 6\")   Wt 115 kg (254 lb)   SpO2 99%  Body mass index is 41 kg/m².    Physical exam was " made by observation   Physical Exam  Constitutional:       General: She is not in acute distress.     Appearance: She is obese.   HENT:      Head: Normocephalic and atraumatic.   Eyes:      Conjunctiva/sclera: Conjunctivae normal.      Pupils: Pupils are equal, round, and reactive to light.   Pulmonary:      Effort: Pulmonary effort is normal. No respiratory distress.   Abdominal:      General: There is no distension.   Musculoskeletal:      Cervical back: Normal range of motion and neck supple.   Skin:     General: Skin is warm and dry.      Findings: No rash.   Neurological:      Mental Status: She is alert and oriented to person, place, and time.      Gait: Gait is intact.   Psychiatric:         Mood and Affect: Affect normal.          Lab/ Imaging Results:  Results  Laboratory Studies  GFR indicates no sign of kidney disease. White blood cell count, red blood cells, hemoglobin, hematocrit, and platelets are normal. Troponin is negative. Basic chemistry, electrolytes, sugars (95), BUN and creatinine, calcium levels, liver enzymes and liver function are all normal.    Imaging  X-ray was normal.    Please note that this dictation was created using voice recognition software. I have made every reasonable attempt to correct obvious errors, but I expect that there are errors of grammar and possibly content that I did not discover before finalizing the note.

## 2024-09-05 NOTE — PROCEDURES
DIAGNOSTIC HOME SLEEP TEST (HST) REPORT WatchPAT      PATIENT ID:  NAME:  Cecelia Baldwin  MRN:               [id number]  YOB: 1985  DATE OF STUDY: 8/29/2024      Impression:     This study shows evidence of:      1. Severe obstructive sleep apnea with PAT apnea hypopnea index(pAHI) of 33.4 per hour.  PAT respiratory disturbance index (pRDI) was 35.8 per hour. These findings are based on 7 channels recording of PAT signal with sleep staging, heart rate, pulse oximetry, actigraphy, body position, snoring and respiratory movement.     2. Oxygenation O2 Sat. mean O2 sat was 92%,  jv was 77%,  and maximum O2 at 98 %. O2 sat was at or  below 88% for 8 min of evaluation time. Oxygen Desaturation (>=4%) Index was 19.9/hr. AVG HR was 61 BPM.      TECHNICAL DESCRIPTION: Patient underwent home sleep apnea testing with peripheral arterial tone signal (WatchPAT™). This is a Type IV portable monitor and device per Medicare. Monitoring was done with 7 channels recording of PAT signal with sleep staging, heart rate, pulse oximetry, actigraphy, body position, snoring and respiratory movement. Prior to using the device, the patient received verbal and written instructions for its application and was provided with the help desk phone number for additional telephonic instruction with 24-hour availability of qualified personnel to answer questions.    Respiratory events:        General sleep summary: . Total recording time is 10 hours and 46 minutes and total Sleep time is 10 hours and 7 minutes. The patient spent 449 minutes in the supine position and 159 minutes in the nonsupine position.      Recommendations:    1. CPAP titration study vs Auto CPAP trial.  If starting auto CPAP would recommend minimum pressure 5 cmH2O maximum pressure 15 cmH2O  2. In general patients with sleep apnea are advised to avoid alcohol and sedatives and to not operate a motor vehicle while drowsy. In some cases alternative  treatment options may prove effective in resolving sleep apnea in these options include upper airway surgery, the use of a dental orthotic or weight loss and positional therapy. Clinical correlation is required.         Romeo Coffey MD

## 2024-09-18 ENCOUNTER — APPOINTMENT (OUTPATIENT)
Dept: RADIOLOGY | Facility: MEDICAL CENTER | Age: 39
End: 2024-09-18
Attending: FAMILY MEDICINE
Payer: COMMERCIAL

## 2024-10-17 ENCOUNTER — HOSPITAL ENCOUNTER (OUTPATIENT)
Dept: RADIOLOGY | Facility: MEDICAL CENTER | Age: 39
End: 2024-10-17
Attending: FAMILY MEDICINE
Payer: COMMERCIAL

## 2024-10-17 DIAGNOSIS — N92.1 MENORRHAGIA WITH IRREGULAR CYCLE: ICD-10-CM

## 2024-10-17 PROCEDURE — 76830 TRANSVAGINAL US NON-OB: CPT

## 2025-02-28 ENCOUNTER — OFFICE VISIT (OUTPATIENT)
Dept: MEDICAL GROUP | Facility: MEDICAL CENTER | Age: 40
End: 2025-02-28
Payer: COMMERCIAL

## 2025-02-28 ENCOUNTER — HOSPITAL ENCOUNTER (OUTPATIENT)
Dept: RADIOLOGY | Facility: MEDICAL CENTER | Age: 40
End: 2025-02-28
Attending: FAMILY MEDICINE
Payer: COMMERCIAL

## 2025-02-28 ENCOUNTER — RESULTS FOLLOW-UP (OUTPATIENT)
Dept: MEDICAL GROUP | Facility: MEDICAL CENTER | Age: 40
End: 2025-02-28

## 2025-02-28 ENCOUNTER — HOSPITAL ENCOUNTER (OUTPATIENT)
Facility: MEDICAL CENTER | Age: 40
End: 2025-02-28
Attending: FAMILY MEDICINE
Payer: COMMERCIAL

## 2025-02-28 VITALS
SYSTOLIC BLOOD PRESSURE: 112 MMHG | DIASTOLIC BLOOD PRESSURE: 80 MMHG | OXYGEN SATURATION: 100 % | HEIGHT: 66 IN | WEIGHT: 255 LBS | TEMPERATURE: 98 F | HEART RATE: 76 BPM | BODY MASS INDEX: 40.98 KG/M2

## 2025-02-28 DIAGNOSIS — M25.561 CHRONIC PAIN OF RIGHT KNEE: ICD-10-CM

## 2025-02-28 DIAGNOSIS — Z13.220 ENCOUNTER FOR LIPID SCREENING FOR CARDIOVASCULAR DISEASE: ICD-10-CM

## 2025-02-28 DIAGNOSIS — M72.2 PLANTAR FASCIITIS, BILATERAL: ICD-10-CM

## 2025-02-28 DIAGNOSIS — Z13.6 ENCOUNTER FOR LIPID SCREENING FOR CARDIOVASCULAR DISEASE: ICD-10-CM

## 2025-02-28 DIAGNOSIS — I10 PRIMARY HYPERTENSION: ICD-10-CM

## 2025-02-28 DIAGNOSIS — G89.29 CHRONIC PAIN OF RIGHT KNEE: ICD-10-CM

## 2025-02-28 DIAGNOSIS — Z13.1 SCREENING FOR DIABETES MELLITUS: ICD-10-CM

## 2025-02-28 DIAGNOSIS — Z13.29 THYROID DISORDER SCREENING: ICD-10-CM

## 2025-02-28 LAB
ALBUMIN SERPL BCP-MCNC: 4.3 G/DL (ref 3.2–4.9)
ALBUMIN/GLOB SERPL: 1.4 G/DL
ALP SERPL-CCNC: 67 U/L (ref 30–99)
ALT SERPL-CCNC: 20 U/L (ref 2–50)
ANION GAP SERPL CALC-SCNC: 10 MMOL/L (ref 7–16)
AST SERPL-CCNC: 19 U/L (ref 12–45)
BASOPHILS # BLD AUTO: 0.6 % (ref 0–1.8)
BASOPHILS # BLD: 0.04 K/UL (ref 0–0.12)
BILIRUB SERPL-MCNC: 0.9 MG/DL (ref 0.1–1.5)
BUN SERPL-MCNC: 13 MG/DL (ref 8–22)
CALCIUM ALBUM COR SERPL-MCNC: 8.9 MG/DL (ref 8.5–10.5)
CALCIUM SERPL-MCNC: 9.1 MG/DL (ref 8.4–10.2)
CHLORIDE SERPL-SCNC: 104 MMOL/L (ref 96–112)
CHOLEST SERPL-MCNC: 182 MG/DL (ref 100–199)
CO2 SERPL-SCNC: 23 MMOL/L (ref 20–33)
CREAT SERPL-MCNC: 0.68 MG/DL (ref 0.5–1.4)
EOSINOPHIL # BLD AUTO: 0.13 K/UL (ref 0–0.51)
EOSINOPHIL NFR BLD: 1.8 % (ref 0–6.9)
ERYTHROCYTE [DISTWIDTH] IN BLOOD BY AUTOMATED COUNT: 44.9 FL (ref 35.9–50)
FASTING STATUS PATIENT QL REPORTED: NORMAL
GFR SERPLBLD CREATININE-BSD FMLA CKD-EPI: 113 ML/MIN/1.73 M 2
GLOBULIN SER CALC-MCNC: 3.1 G/DL (ref 1.9–3.5)
GLUCOSE SERPL-MCNC: 99 MG/DL (ref 65–99)
HCT VFR BLD AUTO: 43.2 % (ref 37–47)
HDLC SERPL-MCNC: 48 MG/DL
HGB BLD-MCNC: 14.1 G/DL (ref 12–16)
IMM GRANULOCYTES # BLD AUTO: 0.03 K/UL (ref 0–0.11)
IMM GRANULOCYTES NFR BLD AUTO: 0.4 % (ref 0–0.9)
LDLC SERPL CALC-MCNC: 116 MG/DL
LYMPHOCYTES # BLD AUTO: 2.37 K/UL (ref 1–4.8)
LYMPHOCYTES NFR BLD: 33.1 % (ref 22–41)
MCH RBC QN AUTO: 30.5 PG (ref 27–33)
MCHC RBC AUTO-ENTMCNC: 32.6 G/DL (ref 32.2–35.5)
MCV RBC AUTO: 93.3 FL (ref 81.4–97.8)
MONOCYTES # BLD AUTO: 0.81 K/UL (ref 0–0.85)
MONOCYTES NFR BLD AUTO: 11.3 % (ref 0–13.4)
NEUTROPHILS # BLD AUTO: 3.78 K/UL (ref 1.82–7.42)
NEUTROPHILS NFR BLD: 52.8 % (ref 44–72)
NRBC # BLD AUTO: 0 K/UL
NRBC BLD-RTO: 0 /100 WBC (ref 0–0.2)
PLATELET # BLD AUTO: 327 K/UL (ref 164–446)
PMV BLD AUTO: 9.5 FL (ref 9–12.9)
POTASSIUM SERPL-SCNC: 5 MMOL/L (ref 3.6–5.5)
PROT SERPL-MCNC: 7.4 G/DL (ref 6–8.2)
RBC # BLD AUTO: 4.63 M/UL (ref 4.2–5.4)
SODIUM SERPL-SCNC: 137 MMOL/L (ref 135–145)
T4 FREE SERPL-MCNC: 0.93 NG/DL (ref 0.93–1.7)
TRIGL SERPL-MCNC: 89 MG/DL (ref 0–149)
TSH SERPL DL<=0.005 MIU/L-ACNC: 2.38 UIU/ML (ref 0.38–5.33)
WBC # BLD AUTO: 7.2 K/UL (ref 4.8–10.8)

## 2025-02-28 PROCEDURE — 83036 HEMOGLOBIN GLYCOSYLATED A1C: CPT

## 2025-02-28 PROCEDURE — 80053 COMPREHEN METABOLIC PANEL: CPT

## 2025-02-28 PROCEDURE — 80061 LIPID PANEL: CPT

## 2025-02-28 PROCEDURE — 36415 COLL VENOUS BLD VENIPUNCTURE: CPT

## 2025-02-28 PROCEDURE — 84443 ASSAY THYROID STIM HORMONE: CPT

## 2025-02-28 PROCEDURE — 85025 COMPLETE CBC W/AUTO DIFF WBC: CPT

## 2025-02-28 PROCEDURE — 73564 X-RAY EXAM KNEE 4 OR MORE: CPT | Mod: RT

## 2025-02-28 PROCEDURE — 73620 X-RAY EXAM OF FOOT: CPT | Mod: RT

## 2025-02-28 PROCEDURE — 73620 X-RAY EXAM OF FOOT: CPT | Mod: LT

## 2025-02-28 PROCEDURE — 84439 ASSAY OF FREE THYROXINE: CPT

## 2025-02-28 ASSESSMENT — PATIENT HEALTH QUESTIONNAIRE - PHQ9: CLINICAL INTERPRETATION OF PHQ2 SCORE: 0

## 2025-02-28 ASSESSMENT — FIBROSIS 4 INDEX: FIB4 SCORE: 0.46

## 2025-02-28 NOTE — PROGRESS NOTES
Verbal consent was acquired by the patient to use [email or web address] ambient listening note generation during this visit:  Yes      Chief complaint::Diagnoses of Primary hypertension, Plantar fasciitis, bilateral, Chronic pain of right knee, Screening for diabetes mellitus, Encounter for lipid screening for cardiovascular disease, and Thyroid disorder screening were pertinent to this visit.    Assessment and Plan:   The following treatment plan was discussed:     Assessment & Plan  1. Primary hypertension: Chronic, stable.  - Continue losartan 50 mg daily.    2. Bilateral plantar fasciitis: Chronic, unstable.  - Recommended stretches, exercises, and using a tennis ball to roll under the foot.  - Referral to podiatry for specialized insoles.  - Ordered bilateral foot x-rays for calcaneal spurring.    3. Chronic right knee pain: Medial pain due to valgus alignment.  - Discussed weight loss.  - Ordered knee x-ray with weightbearing pictures.  - Referral to physical therapy.  - Consider MRI and orthopedic consultation if symptoms persist.    4. Morbid obesity: Chronic, stable.  - Discussed calorie counting, increasing protein intake, and enhancing physical exercise.  - Recommended 6693-0664 calories/day, using apps like Munet[employer] or Lose It.  - Advised avoiding drinking calories, cutting out sodas, and focusing on high-protein, low-calorie foods.  - Encouraged drinking water, eating slowly, and avoiding snacking.    Follow-up  - In 6 months for annual labs.  Cecelia was seen today for follow-up and pain.    Diagnoses and all orders for this visit:    Primary hypertension  -     CBC WITH DIFFERENTIAL; Future  -     Comp Metabolic Panel; Future  -     ESTIMATED GFR; Future    Plantar fasciitis, bilateral  -     Referral to Podiatry  -     DX-FOOT-2- RIGHT; Future  -     DX-FOOT-2- LEFT; Future    Chronic pain of right knee  -     DX-KNEE COMPLETE 4+ RIGHT; Future  -     Referral to Physical Therapy    Screening for diabetes  mellitus  -     Comp Metabolic Panel; Future  -     ESTIMATED GFR; Future  -     HEMOGLOBIN A1C; Future    Encounter for lipid screening for cardiovascular disease  -     Lipid Profile; Future    Thyroid disorder screening  -     TSH+FREE T4        Followup: Return in about 6 months (around 8/28/2025) for Annual.    Subjective/HPI:     HPI:    Cec[place] Baldwin is a pleasant 39 y.o. female here for   Chief Complaint   Patient presents with    Follow-Up     Blood Pressure    Pain     Pain and tenderness both heels, X1 month        History of Present Illness  The patient is a 39-year-old individual here for follow-up on hypertension, bilateral heel pain, right knee pain, and weight management.    They report satisfactory blood pressure control at home, with readings in the 120s, on losartan 50 mg, which they tolerate well.    They have persistent, tender heel pain exacerbated by prolonged standing and lying down, with no radiation. They also report frequent foot cramping and occasional ankle swelling, with no relief from shoe inserts.    They have had right knee pain for over 3 months, described as raw and bone-on-bone, attributed to increased foot pressure. Knee braces exacerbate leg swelling. They report occasional knee discoloration and locking.    Despite a low food intake and high water consumption diet, they have not lost weight. They do not snack and recently transitioned to a night shift, consuming yogurt. They are concerned about potential weight gain from protein shakes.    FAMILY HISTORY  Their mother experienced menopause in early 40s.    MEDICATIONS  Losartan 50 mg daily.    Current Medicines (including changes today)  Current Outpatient Medications   Medication Sig Dispense Refill    losartan (COZAAR) 50 MG Tab Take 1 Tablet by mouth every day. 90 Tablet 3     No current facility-administered medications for this visit.     Past Medical/ Surgical History  She  has a past medical history of  "Hypertension.  She  has a past surgical history that includes cholecystectomy.       Objective:   /80 (BP Location: Left arm, Patient Position: Sitting, BP Cuff Size: Large adult)   Pulse 76   Temp 36.7 °C (98 °F) (Temporal)   Ht 1.676 m (5' 6\")   Wt 116 kg (255 lb)   SpO2 100%  Body mass index is 41.16 kg/m².    Physical exam was made by observation   Physical Exam  Constitutional:       General: She is not in acute distress.  HENT:      Head: Normocephalic and atraumatic.   Eyes:      Conjunctiva/sclera: Conjunctivae normal.      Pupils: Pupils are equal, round, and reactive to light.   Pulmonary:      Effort: Pulmonary effort is normal. No respiratory distress.   Abdominal:      General: There is no distension.   Musculoskeletal:      Cervical back: Normal range of motion and neck supple.      Right knee: Tenderness present over the medial joint line and MCL.      Instability Tests: Anterior drawer test negative. Posterior drawer test negative.   Skin:     General: Skin is warm and dry.      Findings: No rash.   Neurological:      Mental Status: She is alert and oriented to person, place, and time.      Gait: Gait is intact.   Psychiatric:         Mood and Affect: Affect normal.          Lab/ Imaging Results:  No labs or imaging to review    Please note that this dictation was created using voice recognition software. I have made every reasonable attempt to correct obvious errors, but I expect that there are errors of grammar and possibly content that I did not discover before finalizing the note.      "

## 2025-03-01 LAB
EST. AVERAGE GLUCOSE BLD GHB EST-MCNC: 126 MG/DL
HBA1C MFR BLD: 6 % (ref 4–5.6)

## 2025-03-04 NOTE — Clinical Note
REFERRAL APPROVAL NOTICE         Sent on March 4, 2025                   Cec[place] Baldwin  [address]   [address]  [facility] [address]                   Dear Ms. Baldwin,    After a careful review of the medical information and benefit coverage, [facility] has processed your referral. See below for additional details.    If applicable, you must be actively enrolled with your insurance for coverage of the authorized service. If you have any questions regarding your coverage, please contact your insurance directly.    REFERRAL INFORMATION   Referral #:  [id number]  Referred-To Provider    Referred-By Provider:  Podiatry    YENIFER Braun [facility]      [address]  [address]  [place] [address]  [phone] [address]  [address]  [facility] [address]  [phone]    Referral Start Date:  02/28/2025  Referral End Date:   02/28/2026             SCHEDULING  If you do not already have an appointment, please call [phone] to make an appointment.     MORE INFORMATION  If you do not already have a [email or web address] account, sign up at: [email or web address]  You can access your medical information, make appointments, see lab results, billing information, and more.  If you have questions regarding this referral, please contact  the [facility] Referrals department at:             [phone]. Monday - Friday 8:00AM - 5:00PM.     Sincerely,    [facility]

## 2025-03-04 NOTE — Clinical Note
REFERRAL APPROVAL NOTICE         Sent on March 4, 2025                   Cec[place] Baldwin  [address]   [address]  [place] [address]                   Dear Ms. Bladwin,    After a careful review of the medical information and benefit coverage, [facility] has processed your referral. See below for additional details.    If applicable, you must be actively enrolled with your insurance for coverage of the authorized service. If you have any questions regarding your coverage, please contact your insurance directly.    REFERRAL INFORMATION   Referral #:  [id number]  Referred-To Department    Referred-By Provider:  Physical Therapy    YENIFER Braun   Phys Therapy [facility]      [address]  [address]  [place] [id number] [phone] [address]  [place] [id number] [phone]    Referral Start Date:  02/28/2025  Referral End Date:   02/28/2026             SCHEDULING  If you do not already have an appointment, please call [phone] to make an appointment.     MORE INFORMATION  If you do not already have a [employer] account, sign up at: [email or web address]  You can access your medical information, make appointments, see lab results, billing information, and more.  If you have questions regarding this referral, please contact  the [facility] Referrals department at:             [phone]. Monday - Friday 8:00AM - 5:00PM.     Sincerely,    [facility]

## 2025-04-09 NOTE — OP THERAPY EVALUATION
"  Outpatient Physical Therapy  INITIAL EVALUATION    [facility] Physical Therapy [address]  [address]  [place] [address]  Phone:  [phone]  Fax:  [phone]    Date of Evaluation: 04/10/2025    Patient: Cec[place] Baldwin  YOB: 1985  MRN: [id number]     Referring Provider: YENIFER Braun  [address]  [address]  [place] [address]   Referring Diagnosis Chronic pain of right knee [M25.561, G89.29]     Time Calculation  Start time: 1400  Stop time: 1440 Time Calculation (min): 40 minutes         Chief Complaint: Knee Problem and Difficulty Walking    Visit Diagnoses     ICD-10-CM   1. Chronic pain of right knee  M25.561    G89.29       Date of onset of impairment: 11/1/2024    Subjective:   History of Present Illness:     Mechanism of injury:  Pt states that she has been having pn since Nov last year. She thinks it started when turning to do something and she twisted her knee and had pain. She limps. It feels stiff. It feels raw sometimes like bone to bone. Her knee hyperextends  and her knee lou. She works at [employer]. She does a lot of standing and pushing things. The pn in the knee is along the ant medial knee. No numbness or tingling. No pn in upper or lower leg. Her knee has progressively been getting worse. She is unable to run or kneel. Sleep is okay but she takes medication to sleep.     Aggs:   Walks w/ a limp  Walk to fast her knee hyperextends  Stand to reach for sometimes hyperextends.- happens 3-4x/day  Lou 3-4x/day when walking    Walking >10 min pn inc's, stops at 15 min. (10/10)  Standing in place pn inc's >30-60 min, mostly stands at work  Pn at end of shift (7-8/10), has to take tylenol or Ibu to sleep after work   Kneeling - 9/10     Eases:   Tylenol, Ibu, rest, sitting        HPI 2/28/25  \"Chronic right knee pain: Medial pain due to valgus alignment.  - Discussed weight loss.  - Ordered knee x-ray with weightbearing pictures.  - " "Referral to physical therapy.  - Consider MRI and orthopedic consultation if symptoms persist.\"  Pain:     Current pain ratin    At best pain rating:  3    At worst pain rating:  10  Patient Goals:     Other patient goals:  Run, kneel, walk where she needs to go      Past Medical History:   Diagnosis Date    Hypertension      Past Surgical History:   Procedure Laterality Date    CHOLECYSTECTOMY       Social History     Tobacco Use    Smoking status: Never    Smokeless tobacco: Never   Substance Use Topics    Alcohol use: Yes     Alcohol/week: 0.6 oz     Types: 1 Cans of beer per week     Comment: once a month     Family and Occupational History     Socioeconomic History    Marital status: Single     Spouse name: Not on file    Number of children: Not on file    Years of education: Not on file    Highest education level: Associate degree: academic program   Occupational History    Not on file       Objective     General Comments     Knee Comments  Sit to stand: 5/10, sig weight shift to L, fear   SLS: R unable, L 20 sec  Gait: dec'd WB time on R LE, dec'd R arm swing, dec'd stride length    Knee AROM:  R: 5*-0-115*  L : 5-0-129    Quad kamran: able on R but sig pn in ant knee and unable to get heel lift off    Hip MMT:   Flex: R NT d/t pn, L 4  IR: R NT d/t pn, L 4+  ER: R NT d/t pn, L 4+    Hip PROM: NT    Kacy's: unable to get accurate test d/t pn  Thessaley's: NT  Ant Drawer Test: neg  Lauchman's Test:neg  Post Drawer Test:neg    TTP: greatest to medial joint line, minor over patellar tendon    Patellar mobility: WNL but minor discomfort w/ inf glide     Patellar compression test: neg               Therapeutic Exercises (CPT 04443):     2. quad set w/ 2 towels, 10x10\"    3. heel slide, x10    19. Certification Period: 04/10/2025 to  25      Time-based treatments/modalities:    Physical Therapy Timed Treatment Charges  Therapeutic exercise minutes (CPT 34799): 10 minutes      Assessment, Response and " Plan:   Impairments: abnormal ADL function, abnormal gait, abnormal muscle firing, abnormal or restricted ROM, activity intolerance, difficulty performing job, impaired functional mobility, impaired balance, impaired physical strength, lacks appropriate home exercise program, limited ADL's, limited mobility, pain with function, safety issue and weight-bearing intolerance    Assessment details:  Ms. Baldwin is a 38 y/o female who presents in PT w/ s/s consistent w/ possible R knee power coordination deficits and and mobility deficits. She presents w/ deficits in dec knee flex AROM w/ pn, pn at end range ext, sig global LE weakness, gait deviations w/ dec'd WB tolerance, poor balance, TTP to medial joint line, and pain that are preventing her from walking, standing in place, and climbing stairs w/o pn. Pt will cont to benefit from PT at this time in order to address her functional limitations and help her reach her functional goals.       Barriers to therapy:  Age, comorbidities, poorly tolerated treatments and time constraints  Other barriers to therapy:  Time since onset  Prognosis: good    Goals:   Short Term Goals:   Pt will demo good compliance to HEP   Pt will demo knee flexion of 130 and ext of -5 w/o inc'd pn  Pt will manage pn after work to <4/10 w/in 20 min w/ HEP  Pt will improve WOMAC score to <45  Pt will demo WNL gait mechanics   Short term goal time span:  2-3 months      Long Term Goals:    Pt will improve standing tolerance throughout work w/ pn 3/10 or less by EOS  Pt will improve walking tolerance to 30 min w/o inc'd pn from baseline  Pt will improve WOMAC score to <30  Pt will no longer experience buckling or hyperextending w/ all walking w/in last 3 weeks  Long term goal time span:  2-4 months    Plan:   Therapy options:  Physical therapy treatment to continue  Planned therapy interventions:  Neuromuscular Re-education (CPT 33582), Gait Training (CPT 62320), E Stim Unattended (CPT 19359), Hot or  Cold Pack Therapy (CPT 50658), Manual Therapy (CPT 00903), Therapeutic Exercise (CPT 57747) and Therapeutic Activities (CPT 08698)  Frequency:  1x week  Duration in weeks:  12  Duration in visits:  12  Discussed with:  Patient  Plan details:  Restore ROM, progressive OCK strengthening, gait mechanics, progress functional LE strengthening and quad control       Functional Assessment Used  WOMAC Grand Total: 60.42     Referring provider co-signature:  I have reviewed this plan of care and my co-signature certifies the need for services.    Certification Period: 04/10/2025 to  07/03/25    Physician Signature: ________________________________ Date: ______________

## 2025-04-10 ENCOUNTER — PHYSICAL THERAPY (OUTPATIENT)
Dept: PHYSICAL THERAPY | Facility: REHABILITATION | Age: 40
End: 2025-04-10
Attending: FAMILY MEDICINE
Payer: COMMERCIAL

## 2025-04-10 DIAGNOSIS — G89.29 CHRONIC PAIN OF RIGHT KNEE: ICD-10-CM

## 2025-04-10 DIAGNOSIS — M25.561 CHRONIC PAIN OF RIGHT KNEE: ICD-10-CM

## 2025-04-10 PROCEDURE — 97162 PT EVAL MOD COMPLEX 30 MIN: CPT

## 2025-04-10 PROCEDURE — 97110 THERAPEUTIC EXERCISES: CPT

## 2025-04-10 ASSESSMENT — ENCOUNTER SYMPTOMS
PAIN SCALE AT HIGHEST: 10
PAIN SCALE: 5
PAIN SCALE AT LOWEST: 3

## 2025-04-10 ASSESSMENT — ACTIVITIES OF DAILY LIVING (ADL): POOR_BALANCE: 1

## 2025-04-17 ENCOUNTER — APPOINTMENT (OUTPATIENT)
Dept: PHYSICAL THERAPY | Facility: REHABILITATION | Age: 40
End: 2025-04-17
Attending: FAMILY MEDICINE
Payer: COMMERCIAL

## 2025-04-17 NOTE — OP THERAPY DAILY TREATMENT
"  Outpatient Physical Therapy  DAILY TREATMENT     [facility] Physical [address].  [address]  [place] [address]  Phone:  [phone]  Fax:  [phone]    Date: 04/17/2025    Patient: [place] Baldwin  YOB: 1985  MRN: [id number]     Time Calculation                   Chief Complaint: No chief complaint on file.    Visit #: 2    SUBJECTIVE:  ***    Aggs:   Walks w/ a limp  Walk to fast her knee hyperextends  Stand to reach for sometimes hyperextends.- happens 3-4x/day  Lou 3-4x/day when walking    Walking >10 min pn inc's, stops at 15 min. (10/10)  Standing in place pn inc's >30-60 min, mostly stands at work  Pn at end of shift (7-8/10), has to take tylenol or Ibu to sleep after work   Kneeling - 9/10        OBJECTIVE:  Sit to stand: 5/10, sig weight shift to L, fear   SLS: R unable, L 20 sec  Gait: dec'd WB time on R LE, dec'd R arm swing, dec'd stride length     Knee AROM:  R: 5*-0-115*  L : 5-0-129     Quad kamran: able on R but sig pn in ant knee and unable to get heel lift off     Hip MMT:   Flex: R NT d/t pn, L 4  IR: R NT d/t pn, L 4+  ER: R NT d/t pn, L 4+     Hip PROM: NT     Kacy's: unable to get accurate test d/t pn  Thessaley's: NT  Ant Drawer Test: neg  Lauchman's Test:neg  Post Drawer Test:neg     TTP: greatest to medial joint line, minor over patellar tendon     Patellar mobility: WNL but minor discomfort w/ inf glide      Patellar compression test: neg              Therapeutic Exercises (CPT 53627):     2. quad set w/ 2 towels, 10x10\"    3. heel slide, x10    19. Certification Period: 04/10/2025 to  07/03/25      Time-based treatments/modalities:           ASSESSMENT:   Response to treatment: ***      Goals:   Short Term Goals:   Pt will demo good compliance to HEP   Pt will demo knee flexion of 130 and ext of -5 w/o inc'd pn  Pt will manage pn after work to <4/10 w/in 20 min w/ HEP  Pt will improve WOMAC score to <45  Pt will demo WNL gait mechanics "   Short term goal time span:  2-3 months      Long Term Goals:    Pt will improve standing tolerance throughout work w/ pn 3/10 or less by EOS  Pt will improve walking tolerance to 30 min w/o inc'd pn from baseline  Pt will improve WOMAC score to <30  Pt will no longer experience buckling or hyperextending w/ all walking w/in last 3 weeks  Long term goal time span:  2-4 months    PLAN/RECOMMENDATIONS:   Restore ROM, progressive OCK strengthening, gait mechanics, progress functional LE strengthening and quad control

## 2025-04-23 NOTE — OP THERAPY DAILY TREATMENT
"  Outpatient Physical Therapy  DAILY TREATMENT     [facility] Physical [address].  [address]  [place] [address]  Phone:  [phone]  Fax:  [phone]    Date: 04/24/2025    Patient: [place] Baldwin  YOB: 1985  MRN: [id number]     Time Calculation    Start time: 1445  Stop time: 1545 Time Calculation (min): 60 minutes         Chief Complaint: No chief complaint on file.    Visit #: 2    SUBJECTIVE:  Pt states that she worked a lot which pushed her to the limit with her knee. It would burn and feels raw. She did the exercises daily 1-2x/day. She is moving which is hard. She did notice a little progress in her motion.    Aggs:   Walks w/ a limp  Walk to fast her knee hyperextends  Stand to reach for sometimes hyperextends.- happens 3-4x/day  Lou 3-4x/day when walking    Walking >10 min pn inc's, stops at 15 min. (10/10)  Standing in place pn inc's >30-60 min, mostly stands at work  Pn at end of shift (7-8/10), has to take tylenol or Ibu to sleep after work   Kneeling - 9/10        OBJECTIVE:  Sit to stand: 5/10, sig weight shift to L, fear   SLS: R unable, L 20 sec  Gait: dec'd WB time on R LE, dec'd R arm swing, dec'd stride length     Knee AROM:  R: 5*-0-129*  L : 5-0-129     Quad kamran: able on R but sig pn in ant knee and unable to get heel lift off     Hip MMT:   Flex: R NT d/t pn, L 4  IR: R NT d/t pn, L 4+  ER: R NT d/t pn, L 4+     Hip PROM: NT     Kacy's: unable to get accurate test d/t pn  Thessaley's: NT  Ant Drawer Test: neg  Lauchman's Test:neg  Post Drawer Test:neg     TTP: greatest to medial joint line, minor over patellar tendon     Patellar mobility: WNL but minor discomfort w/ inf glide      Patellar compression test: neg              Therapeutic Exercises (CPT 87803):     1. NuStep, L3 5', slow speed    2. quad set w/ 2 towels, 10x10\"    3. heel slide w/ strap, x10    4. SAQ, 5x20\"    5. LAQ, 3xfat reps    6. bridge, 2x10    19. Certification " Period: 04/10/2025 to  07/03/25      Therapeutic Exercise Summary: Access Code: [email or web address]  URL: [email or web address]  Date: 04/24/2025  Prepared by: Alyce Stern    Exercises  - Supine Quad Set  - 3 x daily - 7 x weekly - 10 reps - 10 seconds hold  - Supine Heel Slide  - 3 x daily - 7 x weekly - 10 reps  - Supine Bridge  - 3 x daily - 7 x weekly - 10 reps  - Seated Long Arc Quad  - 3 x daily - 7 x weekly - 10-20 reps    Therapeutic Treatments and Modalities:     1. E Stim Unattended (CPT 37557), IFC to R knee w/ MHP. set to patient tolerance  for 15'. left w/ bell if needed    Time-based treatments/modalities:    Physical Therapy Timed Treatment Charges  Therapeutic exercise minutes (CPT 96375): 40 minutes      ASSESSMENT:   Pt cont's to have sig pn in R knee limiting strength progression. She had pn w/ all exercise today but advised to keep it all tolerable and stop when inc's. She was educated on importance of performing HEP daily in order to cont ROM, strength, and manage her pn. She tolerated NuStep well when pushing slowly at 20 SPM. Ended w/ heat and IFC for pn control. Pt will cont to benefit from PT at this time.       Goals:   Short Term Goals:   Pt will demo good compliance to HEP   Pt will demo knee flexion of 130 and ext of -5 w/o inc'd pn  Pt will manage pn after work to <4/10 w/in 20 min w/ HEP  Pt will improve WOMAC score to <45  Pt will demo WNL gait mechanics   Short term goal time span:  2-3 months      Long Term Goals:    Pt will improve standing tolerance throughout work w/ pn 3/10 or less by EOS  Pt will improve walking tolerance to 30 min w/o inc'd pn from baseline  Pt will improve WOMAC score to <30  Pt will no longer experience buckling or hyperextending w/ all walking w/in last 3 weeks  Long term goal time span:  2-4 months    PLAN/RECOMMENDATIONS:   Restore ROM, progressive OCK strengthening, gait mechanics, progress functional LE strengthening and quad control

## 2025-04-24 ENCOUNTER — PHYSICAL THERAPY (OUTPATIENT)
Dept: PHYSICAL THERAPY | Facility: REHABILITATION | Age: 40
End: 2025-04-24
Attending: FAMILY MEDICINE
Payer: COMMERCIAL

## 2025-04-24 DIAGNOSIS — G89.29 CHRONIC PAIN OF RIGHT KNEE: ICD-10-CM

## 2025-04-24 DIAGNOSIS — M25.561 CHRONIC PAIN OF RIGHT KNEE: ICD-10-CM

## 2025-04-24 PROCEDURE — 97014 ELECTRIC STIMULATION THERAPY: CPT

## 2025-04-24 PROCEDURE — 97110 THERAPEUTIC EXERCISES: CPT

## 2025-05-12 ENCOUNTER — TELEPHONE (OUTPATIENT)
Dept: PHYSICAL THERAPY | Facility: REHABILITATION | Age: 40
End: 2025-05-12
Payer: COMMERCIAL

## 2025-05-12 NOTE — OP THERAPY DISCHARGE SUMMARY
Outpatient Physical Therapy  DISCHARGE SUMMARY NOTE      [facility] Physical Therapy [address]  [address]  [place] [address]  Phone:  [phone]  Fax:  [phone]    Date of Visit: 05/12/2025    Patient: Cec[place] Baldwin  YOB: 1985  MRN: [id number]     Referring Provider: NORMAN BraunRGabrielNGabriel  [address] [facility]  [address]  [place] [address]   Referring Diagnosis Chronic pain of right knee [M25.561, G89.29]         Functional Assessment Used        Your patient is being discharged from Physical Therapy with the following comments:   Patient cancelled or missed more than 2 scheduled appointments/non-compliant    Comments:  Pt is self discharged at this time d/t our late cancel or no show policy. They were advised in previous sessions to cont compliance to HEP and contact PT as needed w/ any questions or concerns. Pt may return to PT in future as needed w/ new referral and check in w/referring provider.       Alyce Stern, PT    Date: 5/12/2025

## 2025-07-18 ENCOUNTER — APPOINTMENT (OUTPATIENT)
Dept: MEDICAL GROUP | Facility: MEDICAL CENTER | Age: 40
End: 2025-07-18
Payer: COMMERCIAL